# Patient Record
Sex: MALE | Race: WHITE | URBAN - METROPOLITAN AREA
[De-identification: names, ages, dates, MRNs, and addresses within clinical notes are randomized per-mention and may not be internally consistent; named-entity substitution may affect disease eponyms.]

---

## 2017-05-16 ENCOUNTER — RADIANT APPOINTMENT (OUTPATIENT)
Dept: CT IMAGING | Facility: CLINIC | Age: 47
End: 2017-05-16

## 2017-05-16 DIAGNOSIS — Z13.6 SCREENING FOR CARDIOVASCULAR CONDITION: ICD-10-CM

## 2017-05-16 PROCEDURE — 75571 CT HRT W/O DYE W/CA TEST: CPT | Performed by: INTERNAL MEDICINE

## 2017-05-22 ENCOUNTER — TELEPHONE (OUTPATIENT)
Dept: GENERAL RADIOLOGY | Facility: CLINIC | Age: 47
End: 2017-05-22

## 2017-05-22 NOTE — TELEPHONE ENCOUNTER
Called Jose with results of CT calcium score which was 0. This means the patient has no plaque buildup and a very low risk of having a heart attack. Encouraged patient to follow up with PCP and reviewed ways to reduce risk of developing heart disease such as exercise regularly, eat a healthy diet full of fruits and vegetables, decrease risk factors by no smoking, limiting alcohol intake and stress. All questions answered, patient verbalized understanding.